# Patient Record
Sex: FEMALE | Race: WHITE
[De-identification: names, ages, dates, MRNs, and addresses within clinical notes are randomized per-mention and may not be internally consistent; named-entity substitution may affect disease eponyms.]

---

## 2019-08-11 ENCOUNTER — HOSPITAL ENCOUNTER (EMERGENCY)
Dept: HOSPITAL 25 - UCEAST | Age: 19
Discharge: HOME | End: 2019-08-11
Payer: COMMERCIAL

## 2019-08-11 VITALS — DIASTOLIC BLOOD PRESSURE: 80 MMHG | SYSTOLIC BLOOD PRESSURE: 124 MMHG

## 2019-08-11 DIAGNOSIS — H92.01: Primary | ICD-10-CM

## 2019-08-11 DIAGNOSIS — Z88.2: ICD-10-CM

## 2019-08-11 PROCEDURE — G0463 HOSPITAL OUTPT CLINIC VISIT: HCPCS

## 2019-08-11 PROCEDURE — 99202 OFFICE O/P NEW SF 15 MIN: CPT

## 2019-08-11 NOTE — UC
Ear Complaint HPI





- HPI Summary


HPI Summary: 





18 yo female presents with RIGHT ear pain. She tells me that this morning she 

blew her nose and felt a pop in her right ear with instant pain. Since that 

time has had pain and decreased hearing out of the right ear. Prior to this 

instance she was feeling well and had no ear pain, but did have some cold 

symptoms about a week ago. Denies headache, sinus symptoms, sore throat, cough, 

or drainage from the ear. 





- History of Current Complaint


Stated Complaint: EAR ISSUE


Time Seen by Provider: 08/11/19 18:44


Hx Obtained From: Patient


Onset/Duration: Sudden Onset


Severity Initially: Moderate


Severity Currently: Moderate


Pain Intensity: 7


Pain Scale Used: 0-10 Numeric





- Allergies/Home Medications


Allergies/Adverse Reactions: 


 Allergies











Allergy/AdvReac Type Severity Reaction Status Date / Time


 


Sulfa (Sulfonamide Allergy  Rash Verified 08/11/19 18:49





Antibiotics)     











Home Medications: 


 Home Medications





Sertraline* [Zoloft*] 150 tab PO DAILY 08/11/19 [History Confirmed 08/11/19]











PMH/Surg Hx/FS Hx/Imm Hx


Psychological History: Anxiety, Depression





- Surgical History


Surgical History: None





- Family History


Known Family History: Positive: Unknown





- Social History


Lives: With Family


Alcohol Use: None


Substance Use Type: None


Smoking Status (MU): Never Smoked Tobacco





Review of Systems


All Other Systems Reviewed And Are Negative: Yes


Constitutional: Positive: Negative


Skin: Positive: Negative


Eyes: Positive: Negative


ENT: Positive: Ear Ache


Respiratory: Positive: Negative


Cardiovascular: Positive: Negative


Neurovascular: Positive: Negative


Neurological: Positive: Negative


Psychological: Positive: Negative





Physical Exam





- Summary


Physical Exam Summary: 





GENERAL: NAD. WDWN. No pain distress.


SKIN: No rashes, sores, lesions, or open wounds.


HEENT:


            Head: AT/NC


            Eyes: EOM intact. Conjunctiva clear without inflammation or 

discharge.


            Ears: Hearing grossly normal. RIGHT TM  with moderate erythema and 

bulging. TM appears intact without rupture. No canal edema or drainage. NTTP


            Nose: Nasal mucosa pink and moist. NTTP maxillary and frontal 

sinus. 


            Throat: Posterior oropharynx without exudates, erythema, or 

tonsillar enlargement.  Uvula midline.


NECK: Supple. Nontender. No lymphadenopathy. 


CHEST:  CTAB. No r/r/w. No accessory muscle use. Breathing comfortably and in 

no distress.


CV:  RRR. Without m/r/g. Pulses intact. 


NEURO: Alert. 


PSYCH: Age appropriate behavior.


Triage Information Reviewed: Yes


Vital Signs: 





Vital Signs:











Temp Pulse Resp BP Pulse Ox


 


 97.4 F   84   16   124/80   98 


 


 08/11/19 18:46  08/11/19 18:46  08/11/19 18:46  08/11/19 18:46  08/11/19 18:46











Vital Signs Reviewed: Yes





Ear Complaint Course/Dx





- Course


Course Of Treatment: 





Her right ear is significantly injected with bulging - this could be due to 

barotrauma from blowing her nose or she had an underlying ear infection in 

which the pain was exacerbated by blowing her nose.





I advised her monitor her symptoms the remainder of the evening and into the 

morning - if her ear pain is still present in the morning or if she has 

worsening pain to begin the antibiotic for an otitis media.





- Differential Dx/Diagnosis


Provider Diagnosis: 


 Earache, right








Discharge





- Sign-Out/Discharge


Documenting (check all that apply): Patient Departure


All imaging exams completed and their final reports reviewed: No Studies





- Discharge Plan


Condition: Stable


Disposition: HOME


Prescriptions: 


Amoxicillin PO (*) [Amoxicillin 875 MG (*)] 875 mg PO BID #14 tab


Patient Education Materials:  Earache (ED)


Referrals: 


Non Staff,Doctor [Medical Doctor] - 


Additional Instructions: 


If you develop a fever, shortness of breath, chest pain, new or worsening 

symptoms - please call your PCP or go to the ED immediately.


 


Your ear drum is very irritated this evening - likely from blowing your nose 

earlier today. The ear drum itself appears intact and did not rupture.





If your symptoms have not improved by the morning, please start the antibiotic.





- Billing Disposition and Condition


Condition: STABLE


Disposition: Home





- Attestation Statements


Provider Attestation: 





I was available for consult. This patient was seen by the LILLIE. The patient was 

not presented to, seen by, or examined by me. -Chi